# Patient Record
Sex: FEMALE | Race: WHITE
[De-identification: names, ages, dates, MRNs, and addresses within clinical notes are randomized per-mention and may not be internally consistent; named-entity substitution may affect disease eponyms.]

---

## 2021-05-28 ENCOUNTER — HOSPITAL ENCOUNTER (EMERGENCY)
Dept: HOSPITAL 8 - ED | Age: 31
Discharge: HOME | End: 2021-05-28
Payer: COMMERCIAL

## 2021-05-28 ENCOUNTER — HOSPITAL ENCOUNTER (OUTPATIENT)
Dept: HOSPITAL 8 - OUT | Age: 31
Discharge: HOME | End: 2021-05-28
Attending: OBSTETRICS & GYNECOLOGY
Payer: COMMERCIAL

## 2021-05-28 VITALS — DIASTOLIC BLOOD PRESSURE: 75 MMHG | SYSTOLIC BLOOD PRESSURE: 114 MMHG

## 2021-05-28 VITALS — BODY MASS INDEX: 30.31 KG/M2 | WEIGHT: 171.08 LBS | HEIGHT: 63 IN

## 2021-05-28 VITALS — DIASTOLIC BLOOD PRESSURE: 66 MMHG | SYSTOLIC BLOOD PRESSURE: 110 MMHG

## 2021-05-28 VITALS — WEIGHT: 176.37 LBS | HEIGHT: 63 IN | BODY MASS INDEX: 31.25 KG/M2

## 2021-05-28 DIAGNOSIS — Z79.899: ICD-10-CM

## 2021-05-28 DIAGNOSIS — F17.210: ICD-10-CM

## 2021-05-28 DIAGNOSIS — N81.4: Primary | ICD-10-CM

## 2021-05-28 DIAGNOSIS — R33.9: Primary | ICD-10-CM

## 2021-05-28 DIAGNOSIS — N87.9: ICD-10-CM

## 2021-05-28 DIAGNOSIS — N39.3: ICD-10-CM

## 2021-05-28 DIAGNOSIS — N99.89: ICD-10-CM

## 2021-05-28 LAB — HCG UR SG: 1.02 (ref 1–1.03)

## 2021-05-28 PROCEDURE — C1771 REP DEV, URINARY, W/SLING: HCPCS

## 2021-05-28 PROCEDURE — 57260 CMBN ANT PST COLPRHY: CPT

## 2021-05-28 PROCEDURE — 57283 COLPOPEXY INTRAPERITONEAL: CPT

## 2021-05-28 PROCEDURE — 96372 THER/PROPH/DIAG INJ SC/IM: CPT

## 2021-05-28 PROCEDURE — 57288 REPAIR BLADDER DEFECT: CPT

## 2021-05-28 PROCEDURE — 99284 EMERGENCY DEPT VISIT MOD MDM: CPT

## 2021-05-28 PROCEDURE — 36415 COLL VENOUS BLD VENIPUNCTURE: CPT

## 2021-05-28 PROCEDURE — 88305 TISSUE EXAM BY PATHOLOGIST: CPT

## 2021-05-28 PROCEDURE — 58260 VAGINAL HYSTERECTOMY: CPT

## 2021-05-28 PROCEDURE — 81025 URINE PREGNANCY TEST: CPT

## 2021-05-28 PROCEDURE — 85014 HEMATOCRIT: CPT

## 2021-05-28 RX ADMIN — OXYCODONE HYDROCHLORIDE PRN MG: 5 SOLUTION ORAL at 16:06

## 2021-05-28 RX ADMIN — HYDROMORPHONE HYDROCHLORIDE PRN MG: 1 INJECTION, SOLUTION INTRAMUSCULAR; INTRAVENOUS; SUBCUTANEOUS at 14:11

## 2021-05-28 RX ADMIN — FENTANYL CITRATE PRN MCG: 50 INJECTION INTRAMUSCULAR; INTRAVENOUS at 12:05

## 2021-05-28 RX ADMIN — HYDROMORPHONE HYDROCHLORIDE PRN MG: 1 INJECTION, SOLUTION INTRAMUSCULAR; INTRAVENOUS; SUBCUTANEOUS at 12:25

## 2021-05-28 RX ADMIN — HYDROMORPHONE HYDROCHLORIDE PRN MG: 1 INJECTION, SOLUTION INTRAMUSCULAR; INTRAVENOUS; SUBCUTANEOUS at 12:17

## 2021-05-28 RX ADMIN — HYDROMORPHONE HYDROCHLORIDE PRN MG: 1 INJECTION, SOLUTION INTRAMUSCULAR; INTRAVENOUS; SUBCUTANEOUS at 13:26

## 2021-05-28 RX ADMIN — HYDROMORPHONE HYDROCHLORIDE PRN MG: 1 INJECTION, SOLUTION INTRAMUSCULAR; INTRAVENOUS; SUBCUTANEOUS at 12:10

## 2021-05-28 RX ADMIN — OXYCODONE HYDROCHLORIDE PRN MG: 5 SOLUTION ORAL at 12:29

## 2021-05-28 RX ADMIN — HYDROMORPHONE HYDROCHLORIDE PRN MG: 1 INJECTION, SOLUTION INTRAMUSCULAR; INTRAVENOUS; SUBCUTANEOUS at 12:30

## 2021-05-28 RX ADMIN — FENTANYL CITRATE PRN MCG: 50 INJECTION INTRAMUSCULAR; INTRAVENOUS at 11:59

## 2021-05-28 NOTE — NUR
PT HERE FOR LOWER ABD PAIN, PT REPORTS LEMONS CATH NOT DRAINING AFTER VAGINAL 
HYSTERECTOMY AT 1000 TODAY. PT CURRENTLY HAS LEG BAG AND IT APPEARS TO BE 
DRAINGING URINE INTO BAG AT THIS TIME.